# Patient Record
Sex: FEMALE | Race: BLACK OR AFRICAN AMERICAN | NOT HISPANIC OR LATINO | Employment: OTHER | URBAN - METROPOLITAN AREA
[De-identification: names, ages, dates, MRNs, and addresses within clinical notes are randomized per-mention and may not be internally consistent; named-entity substitution may affect disease eponyms.]

---

## 2017-05-08 ENCOUNTER — OFFICE VISIT (OUTPATIENT)
Dept: OBSTETRICS AND GYNECOLOGY | Facility: CLINIC | Age: 67
End: 2017-05-08
Payer: MEDICARE

## 2017-05-08 VITALS
HEIGHT: 64 IN | DIASTOLIC BLOOD PRESSURE: 96 MMHG | SYSTOLIC BLOOD PRESSURE: 172 MMHG | BODY MASS INDEX: 30.39 KG/M2 | WEIGHT: 178 LBS

## 2017-05-08 DIAGNOSIS — Z01.419 ENCOUNTER FOR GYNECOLOGICAL EXAMINATION (GENERAL) (ROUTINE) WITHOUT ABNORMAL FINDINGS: Primary | ICD-10-CM

## 2017-05-08 DIAGNOSIS — Z12.31 SCREENING MAMMOGRAM, ENCOUNTER FOR: ICD-10-CM

## 2017-05-08 DIAGNOSIS — R35.0 URINARY FREQUENCY: ICD-10-CM

## 2017-05-08 PROCEDURE — G0101 CA SCREEN;PELVIC/BREAST EXAM: HCPCS | Mod: PBBFAC,PN | Performed by: OBSTETRICS & GYNECOLOGY

## 2017-05-08 PROCEDURE — 99999 PR PBB SHADOW E&M-NEW PATIENT-LVL II: CPT | Mod: PBBFAC,,, | Performed by: OBSTETRICS & GYNECOLOGY

## 2017-05-08 PROCEDURE — 99202 OFFICE O/P NEW SF 15 MIN: CPT | Mod: PBBFAC,PN,25 | Performed by: OBSTETRICS & GYNECOLOGY

## 2017-05-08 PROCEDURE — G0101 CA SCREEN;PELVIC/BREAST EXAM: HCPCS | Mod: S$PBB,,, | Performed by: OBSTETRICS & GYNECOLOGY

## 2017-05-08 RX ORDER — INSULIN GLARGINE 300 U/ML
INJECTION, SOLUTION SUBCUTANEOUS
Refills: 1 | COMMUNITY
Start: 2017-04-26 | End: 2019-05-23

## 2017-05-08 RX ORDER — LOSARTAN POTASSIUM AND HYDROCHLOROTHIAZIDE 25; 100 MG/1; MG/1
TABLET ORAL
Refills: 6 | COMMUNITY
Start: 2017-04-21

## 2017-05-08 NOTE — PROGRESS NOTES
Subjective:       Patient ID: Davis Jean is a 66 y.o. female.    Chief Complaint:  Well Woman      History of Present Illness  HPI  Annual Exam-Postmenopausal  Patient presents for annual exam. The patient c/o increased urinary frequency --tanna at night for past 6 months; denies dysuria; reports occas leakage--.   The patient is sexually active--using premarin cream for dyspareunia/vaginal dryness. GYN screening history: last pap: was normal and patient does not recall when last pap was and last mammogram: approximate date 2016 and was normal. The patient is not taking hormone replacement therapy. Patient denies post-menopausal vaginal bleeding. The patient wears seatbelts: yes. The patient participates in regular exercise: yes--using exercise bike daily for approx 10 minutes; still followed by neuro for rt leg weakness; has been told she does not have parkinson's--she reports they still are not sure what is wrong with her--walks with use of walker; no longer seeing PT. Has the patient ever been transfused or tattooed?: no. The patient reports that there is not domestic violence in her life.    Denies hot flushes, night sweat;   Feels she is sleeping well    Feels colonoscopy is due next year       GYN & OB History  No LMP recorded. Patient has had a hysterectomy.   Date of Last Pap: No result found    OB History   No data available       Review of Systems  Review of Systems   Constitutional: Negative for activity change, appetite change, chills, diaphoresis, fatigue, fever and unexpected weight change.   HENT: Negative for mouth sores and tinnitus.    Eyes: Negative for discharge and visual disturbance.   Respiratory: Negative for cough, shortness of breath and wheezing.    Cardiovascular: Negative for chest pain, palpitations and leg swelling.   Gastrointestinal: Negative for abdominal pain, bloating, blood in stool, constipation, diarrhea, nausea and vomiting.   Endocrine: Negative for diabetes, hair loss, hot  flashes, hyperthyroidism and hypothyroidism.   Genitourinary: Positive for frequency. Negative for decreased libido, dyspareunia, dysuria, flank pain, genital sores, hematuria, menorrhagia, menstrual problem, pelvic pain, urgency, vaginal bleeding, vaginal discharge, vaginal pain, dysmenorrhea, urinary incontinence, postcoital bleeding, postmenopausal bleeding and vaginal odor.   Musculoskeletal: Negative for back pain and myalgias.   Skin:  Negative for rash, no acne and hair changes.   Neurological: Negative for seizures, syncope, numbness and headaches.   Hematological: Negative for adenopathy. Does not bruise/bleed easily.   Psychiatric/Behavioral: Negative for depression and sleep disturbance. The patient is not nervous/anxious.    Breast: Negative for breast mass, breast pain, nipple discharge and skin changes          Objective:    Physical Exam:   Constitutional: She appears well-developed.     Eyes: Conjunctivae and EOM are normal. Pupils are equal, round, and reactive to light.    Neck: Normal range of motion. Neck supple.     Pulmonary/Chest: Effort normal. Right breast exhibits no mass, no nipple discharge, no skin change and no tenderness. Left breast exhibits no mass, no nipple discharge, no skin change and no tenderness. Breasts are symmetrical.        Abdominal: Soft.     Genitourinary: Rectum normal and vagina normal. Pelvic exam was performed with patient supine. Uterus is absent. Right adnexum displays no mass and no tenderness. Left adnexum displays no mass and no tenderness. No erythema, bleeding, rectocele, cystocele or unspecified prolapse of vaginal walls in the vagina. No vaginal discharge found. Vaginal cuff normal.Labial bartholins normal.Cervix exhibits absence. Also,  no recent pap smear            Musculoskeletal: Normal range of motion.       Neurological: She is alert.    Skin: Skin is warm.    Psychiatric: She has a normal mood and affect.          Assessment:        1. Encounter for  gynecological examination (general) (routine) without abnormal findings    2. Screening mammogram, encounter for               Plan:      Continue annual well woman exam.  Pap not indicated, due to hysterectomy for endometriosis  [facility]  Continue diet, exercise, weight loss  ucx ordered

## 2017-07-18 DIAGNOSIS — M25.561 PAIN IN BOTH KNEES, UNSPECIFIED CHRONICITY: Primary | ICD-10-CM

## 2017-07-18 DIAGNOSIS — M25.562 PAIN IN BOTH KNEES, UNSPECIFIED CHRONICITY: Primary | ICD-10-CM

## 2017-07-19 ENCOUNTER — OFFICE VISIT (OUTPATIENT)
Dept: ORTHOPEDICS | Facility: CLINIC | Age: 67
End: 2017-07-19
Payer: MEDICARE

## 2017-07-19 ENCOUNTER — APPOINTMENT (OUTPATIENT)
Dept: RADIOLOGY | Facility: HOSPITAL | Age: 67
End: 2017-07-19
Attending: ORTHOPAEDIC SURGERY
Payer: MEDICARE

## 2017-07-19 VITALS
WEIGHT: 177.94 LBS | BODY MASS INDEX: 30.38 KG/M2 | RESPIRATION RATE: 12 BRPM | SYSTOLIC BLOOD PRESSURE: 149 MMHG | HEIGHT: 64 IN | HEART RATE: 67 BPM | DIASTOLIC BLOOD PRESSURE: 70 MMHG

## 2017-07-19 DIAGNOSIS — G70.9: Primary | ICD-10-CM

## 2017-07-19 DIAGNOSIS — M25.561 PAIN IN BOTH KNEES, UNSPECIFIED CHRONICITY: ICD-10-CM

## 2017-07-19 DIAGNOSIS — M16.0 ARTHRITIS OF BOTH HIPS: ICD-10-CM

## 2017-07-19 DIAGNOSIS — G20.A1 PARKINSON DISEASE: ICD-10-CM

## 2017-07-19 DIAGNOSIS — M25.562 PAIN IN BOTH KNEES, UNSPECIFIED CHRONICITY: ICD-10-CM

## 2017-07-19 DIAGNOSIS — R52 PAIN: Primary | ICD-10-CM

## 2017-07-19 DIAGNOSIS — M17.0 ARTHRITIS OF BOTH KNEES: ICD-10-CM

## 2017-07-19 PROCEDURE — 73562 X-RAY EXAM OF KNEE 3: CPT | Mod: 26,50,, | Performed by: RADIOLOGY

## 2017-07-19 PROCEDURE — 1126F AMNT PAIN NOTED NONE PRSNT: CPT | Mod: ,,, | Performed by: ORTHOPAEDIC SURGERY

## 2017-07-19 PROCEDURE — 1159F MED LIST DOCD IN RCRD: CPT | Mod: ,,, | Performed by: ORTHOPAEDIC SURGERY

## 2017-07-19 PROCEDURE — 99204 OFFICE O/P NEW MOD 45 MIN: CPT | Mod: S$PBB,,, | Performed by: ORTHOPAEDIC SURGERY

## 2017-07-19 PROCEDURE — 99999 PR PBB SHADOW E&M-EST. PATIENT-LVL III: CPT | Mod: PBBFAC,,, | Performed by: ORTHOPAEDIC SURGERY

## 2017-07-19 PROCEDURE — 99213 OFFICE O/P EST LOW 20 MIN: CPT | Mod: PBBFAC,25,PN | Performed by: ORTHOPAEDIC SURGERY

## 2017-07-19 RX ORDER — PRAVASTATIN SODIUM 40 MG/1
TABLET ORAL
Refills: 5 | COMMUNITY
Start: 2017-07-15 | End: 2018-10-03

## 2017-07-20 DIAGNOSIS — M25.551 RIGHT HIP PAIN: Primary | ICD-10-CM

## 2017-07-21 PROBLEM — M16.0 ARTHRITIS OF BOTH HIPS: Status: ACTIVE | Noted: 2017-07-21

## 2017-07-21 PROBLEM — G70.9: Status: ACTIVE | Noted: 2017-07-21

## 2017-07-21 PROBLEM — M17.0 ARTHRITIS OF BOTH KNEES: Status: ACTIVE | Noted: 2017-07-21

## 2017-07-22 NOTE — PROGRESS NOTES
"Subjective:      Patient ID: Davis Jean is a 66 y.o. female.    Chief Complaint: No chief complaint on file.    HPI: The patient is seen today for another professional opinion regarding her lower extremities "not moving."  She has a complex medical history that involves seeing multiple physicians, both neurologists and orthopedic surgeons.  She has been told that she has Parkinson's disease.  She has been told that she does not have Parkinson's disease.  By history her ambulation capability has point old over the last several years.  Her steps of become shorter and shorter to the point where she is essentially a wheelchair ambulator.  When she walks, she requires the use of a walker.  She is not having pain, she just can't make her legs moved.    Review of Systems   Constitution: Negative for chills, decreased appetite, weight gain and weight loss.   HENT: Negative for congestion, ear pain, hearing loss and sore throat.    Eyes: Negative for blurred vision, double vision, vision loss in left eye, vision loss in right eye and visual disturbance.   Cardiovascular: Negative for chest pain, irregular heartbeat, leg swelling and palpitations.   Respiratory: Negative for cough and shortness of breath.    Endocrine: Negative for cold intolerance and heat intolerance.   Hematologic/Lymphatic: Negative for adenopathy. Does not bruise/bleed easily.   Skin: Negative for color change, nail changes, rash and suspicious lesions.   Musculoskeletal: Positive for arthritis.        Positive for difficulty walking.   Gastrointestinal: Negative for abdominal pain, constipation, diarrhea, heartburn, nausea and vomiting.   Genitourinary: Negative for bladder incontinence and dysuria.   Neurological: Positive for disturbances in coordination. Negative for dizziness, paresthesias and sensory change.   Psychiatric/Behavioral: Negative for altered mental status, depression, memory loss and substance abuse.   Allergic/Immunologic: Negative " for hives and persistent infections.         Objective:            General    Nursing note and vitals reviewed.  Constitutional: She is oriented to person, place, and time. She appears well-developed and well-nourished.   HENT:   Head: Normocephalic.   Nose: Nose normal.   Eyes: EOM are normal. Pupils are equal, round, and reactive to light.   Neck: Normal range of motion. Neck supple.   Cardiovascular: Normal rate and regular rhythm.    Pulmonary/Chest: Effort normal.   Abdominal: Soft. She exhibits no distension. There is no tenderness.   Neurological: She is alert and oriented to person, place, and time. She exhibits normal muscle tone.   Psychiatric: She has a normal mood and affect. Her behavior is normal.     General Musculoskeletal Exam   Gait: abnormal   Pelvic Obliquity: none    Right Ankle/Foot Exam   Right ankle exam is normal.    Range of Motion   The patient has normal right ankle ROM.    Alignment   Forefoot Alignment: normal    Muscle Strength   The patient has normal right ankle strength.    Tests   Anterior drawer: negative  Varus tilt: negative  Heel Walk: able to perform  Tiptoe Walk: able to perform    Other   Sensation: normal  Peroneal Subluxation: negative    Left Ankle/Foot Exam   Left ankle exam is normal.    Range of Motion   The patient has normal left ankle ROM.     Alignment   Forefoot Alignment: normal    Muscle Strength   The patient has normal left ankle strength.    Tests   Anterior drawer: negative  Varus tilt: negative  Heel Walk: able to perform  Tiptoe Walk: able to perform    Other   Sensation: normal  Peroneal Subluxation: negative    Right Knee Exam   Right knee exam is normal.    Inspection   Erythema: absent  Swelling: absent  Effusion: effusion  Deformity: deformity  Bruising: absent    Crepitus   The patient has crepitus of the patella.    Range of Motion   The patient has normal right knee ROM.    Tests   Meniscus   Winston:  Medial - negative Lateral -  negative  Ligament Examination Lachman: normal (-1 to 2mm) PCL-Posterior Drawer: normal (0 to 2mm)     MCL - Valgus: normal (0 to 2mm)  LCL - Varus: normalPivot Shift: normal (Equal)  Posterolateral Corner: unstable (>15 degrees difference)  Patella   Patellar Apprehension: negative  Passive Patellar Tilt: neutral  Patellar Tracking: normal  Patellar Grind: negative    Other   Sensation: normal    Comments:  The patient walks with a very short stepped gait.    Left Knee Exam   Left knee exam is normal.    Inspection   Erythema: absent  Swelling: absent  Effusion: absent  Deformity: deformity  Bruising: absent    Crepitus   The patient has crepitus of the patella.    Range of Motion   The patient has normal left knee ROM.    Tests   Meniscus   Winston:  Medial - negative Lateral - negative  Stability Lachman: normal (-1 to 2mm) PCL-Posterior Drawer: normal (0 to 2mm)  MCL - Valgus: normal (0 to 2mm)  LCL - Varus: normal (0 to 2mm)Pivot Shift: normal (Equal)  Posterolateral Corner: unstable (>15 degrees difference)  Patella   Patellar Apprehension: negative  Passive Patellar Tilt: neutral  Patellar Tracking: normal  Patellar Grind: negative    Other   Sensation: normal    Comments:  The patient walks with a very short stepped gait.    Right Hip Exam   Right hip exam is normal.     Inspection   Swelling: absent  Bruising: absent    Muscle Strength   The patient has normal right hip strength.    Other   Sensation: normal    Comments:  While the patient has minimally decreased internal and external rotation of her hip, it is not painful to place her hip through a range of motion.  Left Hip Exam   Left hip exam is normal.    Inspection   Swelling: absent  Bruising: absent    Range of Motion   The patient has normal left hip ROM.    Muscle Strength   The patient has normal left hip strength.     Other   Sensation: normal    Comments:  While the patient has minimally decreased internal and external rotation of her hip, it  is not painful to place her hip through a range of motion.        Back (L-Spine & T-Spine) / Neck (C-Spine) Exam   Back exam is normal.    Neck (C-Spine) Range of Motion   Flexion:     Normal  Extension: Normal  Right Lateral Bend: normal  Left Lateral Bend: normal  Right Rotation: normal  Left Rotation: normal    Spinal Sensation   Right Side Sensation  C-Spine Level: normal   L-Spine Level: normal  S-Spine Level: normal  T-Spine Level: normal  Left Side Sensation  C-Spine Level: normal  L-Spine Level: normal  S-Spine Level: normal  T-Spine Level: normal    Other She has no scoliosis .  Head Tilt:  Negative      Right Hand/Wrist Exam   Right hand exam is normal.    Inspection   Deformity: Wrist - deformity     Range of Motion   The patient has normal right hand/wrist ROM.    Tests   Phalens Sign: negative  Tinels Sign (Medial Nerve): negative  Finkelstein: negative  Cubital Tunnel Compression Test: negative      Other     Neuorologic Exam    Median Distribution: normal  Ulnar Distribution: normal  Radial Distribution: normal      Left Hand/Wrist Exam   Left hand exam is normal.    Inspection   Deformity: Wrist - absent     Range of Motion   The patient has normal left hand/wrist ROM.    Tests   Phalens Sign: negative  Tinels Sign (Medial Nerve): negative  Finkelstein: negative  Cubital Tunnel Compression Test: negative      Other     Sensory Exam  Median Distribution: normal  Ulnar Distribution: normal  Radial Distribution: normal      Right Elbow Exam   Right elbow exam is normal.    Inspection   Effusion: absent  Bruising: absent  Deformity: absent    Range of Motion   The patient has normal right elbow ROM.    Tests Tinel's Sign (cubital tunnel): negative    Other   Sensation: normal      Left Elbow Exam   Left elbow exam is normal.    Inspection   Effusion: absent  Bruising: absent  Deformity: absent    Range of Motion   The patient has normal left elbow ROM.    Tests Tinel's Sign (cubital tunnel):  negative    Other   Sensation: normal    Right Shoulder Exam   Right shoulder exam is normal.    Tenderness   The patient is tender to palpation of the greater tuberosity.    Range of Motion   Active Abduction: normal   Passive Abduction: normal   Extension: normal   Forward Flexion: normal   Forward Elevation: normal  Adduction: normal  External Rotation 0 degrees: normal   Internal Rotation 0 degrees: normal     Muscle Strength   The patient has normal right shoulder strength.    Tests & Signs   Apprehension: negative  Cross Arm: negative  Impingement: negative    Other   Sensation: normal    Left Shoulder Exam   Left shoulder exam is normal.    Range of Motion   Active Abduction: normal   Passive Abduction: normal   Extension: normal   Forward Flexion: normal   Forward Elevation: normal  Adduction: normal  External Rotation 0 degrees: normal   Internal Rotation 0 degrees: normal     Muscle Strength   The patient has normal left shoulder strength.    Tests & Signs   Apprehension: negative  Cross Arm: negative  Impingement: negative    Other   Sensation: normal       Muscle Strength   Right Upper Extremity   Wrist Extension: 5/5/5   Wrist Flexion: 5/5/5   : 55/5   Elbow Pronation:  5/5   Elbow Supination:  5/5   Elbow Extension: 5/5  Elbow Flexion: 5/5  Intrinsics: 5/5  Left Upper Extremity  Wrist Extension: 5/5   Wrist Flexion: 5/5/5   :  5/5/5   Elbow Pronation:  5/5   Elbow Supination:  5/5   Elbow Extension: 5/5  Elbow Flexion: 5/5  Intrinsics: 5/5  Right Lower Extremity   Hip Abduction: 5/5   Quadriceps:  5/5   Hamstrin/5   Left Lower Extremity   Hip Abduction: 5/5   Quadriceps:  5/5   Hamstrin/5     Reflexes     Left Side  Biceps:  2+  Triceps:  2+  Quadriceps:  2+  Achilles:  2+    Right Side   Biceps:  2+  Triceps:  2+  Quadriceps:  2+  Achilles:  2+    Vascular Exam     Right Pulses  Dorsalis Pedis:      2+  Posterior Tibial:      2+  Radial:                    2+      Left  Pulses  Dorsalis Pedis:      2+  Posterior Tibial:      2+  Radial:                    2+      Capillary Refill  Right Hand: normal capillary refill  Left Hand: normal capillary refill                X-RAY:     2 views of the right hip    Comparison: None.    Findings: There is mild joint space narrowing involving either hip.  There is no evidence for acute fracture or dislocation.  There is is questionable somewhat mottled lucency in the region of the femoral neck/head along with some lucencies possibly present also within the intramedullary cavity with some questionable early erosive changes along the inner cortex of the knee the midshaft of the right femur.  There also some questionable lucency scattered throughout the bony pelvis including the bilateral inferior pubic rami Consider MRI of the right hip and pelvis with contrast for further evaluation  Comparison: None     Bilateral standing ap knees  bilateral sunrise views  right lateral  left lateral     Findings:    Generalized osteopenia with juxta-articular prominence..  Bilateral degenerative changes present.  Findings is suggestive of early infarcts in the distal diametaphysis of the RIGHT femur and proximal RIGHT tibial diametaphysis noted.  Subchondral degenerative cystic change suggested along the medial tibial plateau on the RIGHT.  No fracture or dislocation.  No definite effusion.   Impression         1.  Osteopenia with juxta-articular prominence and mild bilateral degenerative changes.    2.  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    3.  Additional incidental findings as above.  Followup and/or further evaluation as warranted.         Five views of the lumbar spine    Findings: The vertebral bodies demonstrate normal height.  The alignment is within normal limits. There is minimal disc space narrowing noted throughout the lumbar spine. Mild facet arthropathy noted within  the lower lumbar spine. No pars defects.      Assessment:             Encounter Diagnoses   Name Primary?    Parkinson disease     Arthritis of both knees     Arthritis of both hips     Disorder of neuromuscular transmission Yes          Plan:     The patient does not have an orthopedic surgical problem at this time.  The arthritis in her hips and knees is not bad enough to warrant any sort of surgical intervention.  The patient's main complaint is not one of pain but of inability to walk.  She appears to have a disorder of neuromuscular transmission.  Whether or not this involves Parkinson's disease is beyond the realm of my area of expertise.  The patient does not have a classic pill rolling tremor.  It was strongly recommended that the patient return to neurology for further/reevaluation.  I do not have anything to specifically offer from the orthopedic standpoint for this patient.  It should be noted that her lower extremity motor groups were all very strong.  Her ability to put 1 foot in front of the other was, however, severely hampered.  The patient will be seen back on an as-needed basis.

## 2017-08-16 ENCOUNTER — HOSPITAL ENCOUNTER (OUTPATIENT)
Dept: RADIOLOGY | Facility: HOSPITAL | Age: 67
Discharge: HOME | End: 2017-08-16
Attending: ORTHOPAEDIC SURGERY
Payer: MEDICARE

## 2017-08-16 DIAGNOSIS — M25.551 RIGHT HIP PAIN: ICD-10-CM

## 2017-08-16 PROCEDURE — 73721 MRI JNT OF LWR EXTRE W/O DYE: CPT | Mod: TC,PO,RT

## 2017-08-16 PROCEDURE — 73721 MRI JNT OF LWR EXTRE W/O DYE: CPT | Mod: 26,RT,, | Performed by: RADIOLOGY

## 2017-10-11 ENCOUNTER — OFFICE VISIT (OUTPATIENT)
Dept: ORTHOPEDICS | Facility: CLINIC | Age: 67
End: 2017-10-11
Payer: MEDICARE

## 2017-10-11 VITALS
BODY MASS INDEX: 30.38 KG/M2 | RESPIRATION RATE: 16 BRPM | HEIGHT: 64 IN | HEART RATE: 82 BPM | SYSTOLIC BLOOD PRESSURE: 143 MMHG | WEIGHT: 177.94 LBS | DIASTOLIC BLOOD PRESSURE: 68 MMHG

## 2017-10-11 DIAGNOSIS — M25.552 BILATERAL HIP PAIN: Primary | ICD-10-CM

## 2017-10-11 DIAGNOSIS — M31.19 THROMBOTIC THROMBOCYTOPENIC PURPURA (TTP): ICD-10-CM

## 2017-10-11 DIAGNOSIS — M87.051 AVASCULAR NECROSIS OF FEMORAL HEAD, RIGHT: Primary | ICD-10-CM

## 2017-10-11 DIAGNOSIS — M25.551 BILATERAL HIP PAIN: Primary | ICD-10-CM

## 2017-10-11 DIAGNOSIS — M87.052 AVASCULAR NECROSIS OF FEMORAL HEAD, LEFT: ICD-10-CM

## 2017-10-11 PROCEDURE — 99214 OFFICE O/P EST MOD 30 MIN: CPT | Mod: S$PBB,,, | Performed by: ORTHOPAEDIC SURGERY

## 2017-10-11 PROCEDURE — 99999 PR PBB SHADOW E&M-EST. PATIENT-LVL III: CPT | Mod: PBBFAC,,, | Performed by: ORTHOPAEDIC SURGERY

## 2017-10-11 PROCEDURE — 99213 OFFICE O/P EST LOW 20 MIN: CPT | Mod: PBBFAC,PN | Performed by: ORTHOPAEDIC SURGERY

## 2017-10-11 RX ORDER — DICLOFENAC SODIUM 10 MG/G
2 GEL TOPICAL DAILY
Qty: 100 G | Refills: 2 | Status: SHIPPED | OUTPATIENT
Start: 2017-10-11 | End: 2019-05-23

## 2017-10-29 PROBLEM — M87.052 AVASCULAR NECROSIS OF FEMORAL HEAD, LEFT: Status: ACTIVE | Noted: 2017-10-29

## 2017-10-29 PROBLEM — M87.051 AVASCULAR NECROSIS OF FEMORAL HEAD, RIGHT: Status: ACTIVE | Noted: 2017-10-29

## 2017-10-29 PROBLEM — M31.19 THROMBOTIC THROMBOCYTOPENIC PURPURA (TTP): Status: ACTIVE | Noted: 2017-10-29

## 2017-10-29 NOTE — PROGRESS NOTES
"Subjective:     Patient ID: Davis Jean is a 66 y.o. female.    Chief Complaint: Pain of the Right Hip    HPI: The patient is seen in follow-up for his discomfort.  Both her hips bother her but the right is worse than the left.  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  The patient also has avascular necrosis involving other areas as well.  The patient has a history of thrombotic thrombocytopenic purpura.  She has kidneys that are reportedly functioning at only 30% of normal.  The patient is not having enough discomfort at this time to warrant total hip replacement.    Family History   Problem Relation Age of Onset    Diabetes Mother     Heart disease Mother     Cancer Maternal Aunt      Past Medical History:   Diagnosis Date    Diabetes mellitus     Hypertension     Parkinson disease      Social History     Social History    Marital status:      Spouse name: N/A    Number of children: N/A    Years of education: N/A     Social History Main Topics    Smoking status: Never Smoker    Smokeless tobacco: Never Used    Alcohol use No    Drug use: No    Sexual activity: Yes     Partners: Male     Other Topics Concern    None     Social History Narrative    None     Past Surgical History:   Procedure Laterality Date    HYSTERECTOMY       Review of patient's allergies indicates:  No Known Allergies      Medication List with Changes/Refills   New Medications    DICLOFENAC SODIUM (VOLTAREN) 1 % GEL    Apply 2 g topically once daily.   Current Medications    ALENDRONATE (FOSAMAX) 70 MG TABLET    Take 70 mg by mouth every 7 days.    AMLODIPINE (NORVASC) 10 MG TABLET    Take 10 mg by mouth once daily.    ASPIRIN (ECOTRIN) 81 MG EC TABLET    Take 81 mg by mouth once daily.    BD INSULIN PEN NEEDLE UF MINI 31 X 3/16 " NDLE        BRIMONIDINE 0.15 % OPTH DROP (ALPHAGAN) 0.15 % OPHTHALMIC SOLUTION    Place 1 drop into both eyes 2 " (two) times daily.     BRINZOLAMIDE-BRIMONIDINE 1-0.2 % DRPS    Apply to eye every 12 (twelve) hours.    BUPROPION (WELLBUTRIN XL) 300 MG 24 HR TABLET    Take 300 mg by mouth once daily.    CARBIDOPA-LEVODOPA  MG (SINEMET)  MG PER TABLET    Take 1 tablet by mouth 3 (three) times daily.    CONJUGATED ESTROGENS (PREMARIN) VAGINAL CREAM    Place 0.625 g vaginally twice a week.     CONTOUR NEXT STRIPS STRP        DORZOLAMIDE-TIMOLOL 2-0.5% (COSOPT) 2-0.5 % OPHTHALMIC SOLUTION    Place 1 drop into both eyes 2 (two) times daily.     FERROUS SULFATE 325 (65 FE) MG EC TABLET    Take 325 mg by mouth 3 (three) times daily with meals.    FISH OIL-OMEGA-3 FATTY ACIDS 300-1,000 MG CAPSULE    Take 1 g by mouth once daily.    HUMALOG KWIKPEN 100 UNIT/ML INPN PEN        LATANOPROST 0.005 % OPHTHALMIC SOLUTION        LOSARTAN-HYDROCHLOROTHIAZIDE 100-25 MG (HYZAAR) 100-25 MG PER TABLET    TK 1 T PO D    MEGESTROL (MEGACE) 40 MG TAB    Take 40 mg by mouth once daily.    MEGESTROL (MEGACE) 400 MG/10 ML (40 MG/ML) SUSP        METOPROLOL SUCCINATE (TOPROL-XL) 25 MG 24 HR TABLET    Take 25 mg by mouth 2 (two) times daily.     METOPROLOL TARTRATE (LOPRESSOR) 50 MG TABLET    Take 50 mg by mouth 2 (two) times daily.    MIRTAZAPINE (REMERON) 15 MG TABLET    Take 15 mg by mouth nightly as needed.     MULTIVITAMIN-MINERALS-LUTEIN TAB    Take 1 tablet by mouth once daily.    PATANOL 0.1 % OPHTHALMIC SOLUTION        PRAVASTATIN (PRAVACHOL) 10 MG TABLET    Take 10 mg by mouth once daily.     PRAVASTATIN (PRAVACHOL) 40 MG TABLET    TK 1 T PO D    PROMETHAZINE (PHENERGAN) 25 MG TABLET    Take 25 mg by mouth every 6 (six) hours as needed.     ROPINIROLE (REQUIP) 3 MG TABLET    TAKE TWO TABLETS(6MG)  BY MOUTH EVERY DAY    ROPINIROLE (REQUIP) 4 MG TABLET    Take 1 tablet (4 mg total) by mouth nightly.    TOUJEO SOLOSTAR 300 UNIT/ML (1.5 ML) INPN PEN    INJ 25 UNITS SC D       Review of Systems   Constitution: Negative for decreased  "appetite, fever, weight gain and weight loss.   HENT: Negative for ear pain, hearing loss, hoarse voice and sore throat.    Musculoskeletal: Positive for arthritis and joint pain.   Gastrointestinal: Negative.  Negative for constipation, diarrhea, nausea and vomiting.       Objective:   BP (!) 143/68   Pulse 82   Resp 16   Ht 5' 4" (1.626 m)   Wt 80.7 kg (177 lb 14.6 oz)   BMI 30.54 kg/m²       General    Constitutional: She is oriented to person, place, and time. She appears well-developed and well-nourished.   HENT:   Head: Normocephalic.   Nose: Nose normal.   Eyes: EOM are normal. Pupils are equal, round, and reactive to light.   Neck: Normal range of motion. Neck supple.   Cardiovascular: Normal rate and regular rhythm.    Pulmonary/Chest: Effort normal.   Abdominal: Soft. She exhibits no distension. There is no tenderness.   Neurological: She is alert and oriented to person, place, and time.   Psychiatric: She has a normal mood and affect. Her behavior is normal.             The patient has pain with extremes of range of motion in internal and external rotation of both hips.  The right side is more symptomatic than the left.    X-RAY:   Five views of the lumbar spine    Findings: The vertebral bodies demonstrate normal height.  The alignment is within normal limits. There is minimal disc space narrowing noted throughout the lumbar spine. Mild facet arthropathy noted within the lower lumbar spine. No pars defects.  2 views of the right hip    Comparison: None.    Findings: There is mild joint space narrowing involving either hip.  There is no evidence for acute fracture or dislocation.  There is is questionable somewhat mottled lucency in the region of the femoral neck/head along with some lucencies possibly present also within the intramedullary cavity with some questionable early erosive changes along the inner cortex of the knee the midshaft of the right femur.  There also some questionable lucency " scattered throughout the bony pelvis including the bilateral inferior pubic rami Consider MRI of the right hip and pelvis with contrast for further evaluation.  MRI of the right hip without contrast    Technique: Multiplanar multisequence MR imaging of the right hip was performed without contrast.    Comparison: Plain films from 07/19/2017    Findings: There is a serpiginous low T1 signal intensity line with corresponding serpiginous heights T2 signal intensity line surrounding a large portion of the articular surface of the right femoral head.  Findings are most consistent with avascular necrosis.  No subchondral collapse identified.  There is also a smaller area of AVN involving the articular surface of the left femoral head which involves approximately percent of the articular surface.  Small bone infarct also seen in the superior acetabulum on the left.  Additional bone infarct noted in the intertrochanteric region of the right hip.  The remainder of the bony pelvis appears to be intact.  The intrapelvic contents as visualized demonstrate no adenopathy or free fluid.  The patient appears be status post hysterectomy.  The musculature surrounding the right demonstrates normal signal.  No gluteal tendinopathy.  No bursitis.   Impression       1.  Large area of AVN involving the articular surface of the right femoral head.  No subchondral collapse.  Smaller area of AVN seen involving the left femoral head.  Bone infarcts involving the intertrochanteric region of the right hip and the superior acetabulum on the left.             Assessment:         Encounter Diagnoses   Name Primary?    Avascular necrosis of femoral head, right Yes    Avascular necrosis of femoral head, left     Thrombotic thrombocytopenic purpura (TTP)           Plan:     The patient was prescribed Voltaren gel to utilize on one or both hips on an as-needed basis.  The patient will be a candidate for total hip replacement if and when her hip  discomfort increases to an unacceptable level of discomfort and function.  The patient will be seen in 6 months for repeat x-rays of both hips.  Given the patient's age, core decompression of the areas of avascular necrosis is not recommended.  When her arthritic discomfort is bad enough, she is a candidate for joint replacement surgery.             Disclaimer: This note is generated with speech recognition software and is subject to transcription error and sound alike phrases that may be missed by proofreading.

## 2018-04-16 ENCOUNTER — TELEPHONE (OUTPATIENT)
Dept: OBSTETRICS AND GYNECOLOGY | Facility: CLINIC | Age: 68
End: 2018-04-16

## 2018-04-16 NOTE — TELEPHONE ENCOUNTER
I spoke to Dr. Dickson staff and they do not have any sooner appointments. Per their instruction told patient to go to urgent care in [place]. Pt. Verbalized understanding and stated that she will go tomorrow.

## 2018-04-16 NOTE — TELEPHONE ENCOUNTER
----- Message from Tania Gregorio sent at 4/16/2018  9:37 AM CDT -----  Contact: Patient  Patient requesting an appointment for a knot on her vaginal area, but there are no  openings until May, please call her back at [phone]. Thank you

## 2018-04-17 ENCOUNTER — OFFICE VISIT (OUTPATIENT)
Dept: URGENT CARE | Facility: CLINIC | Age: 68
End: 2018-04-17
Payer: MEDICARE

## 2018-04-17 VITALS
HEART RATE: 97 BPM | TEMPERATURE: 99 F | BODY MASS INDEX: 29.17 KG/M2 | OXYGEN SATURATION: 98 % | HEIGHT: 64 IN | RESPIRATION RATE: 18 BRPM | WEIGHT: 170.88 LBS | DIASTOLIC BLOOD PRESSURE: 79 MMHG | SYSTOLIC BLOOD PRESSURE: 184 MMHG

## 2018-04-17 DIAGNOSIS — N76.4 ABSCESS OF LABIA MAJORA: Primary | ICD-10-CM

## 2018-04-17 PROCEDURE — 87070 CULTURE OTHR SPECIMN AEROBIC: CPT

## 2018-04-17 PROCEDURE — 99214 OFFICE O/P EST MOD 30 MIN: CPT | Mod: S$GLB,,, | Performed by: NURSE PRACTITIONER

## 2018-04-17 PROCEDURE — 87147 CULTURE TYPE IMMUNOLOGIC: CPT

## 2018-04-17 PROCEDURE — 99999 PR PBB SHADOW E&M-EST. PATIENT-LVL V: CPT | Mod: PBBFAC,,, | Performed by: NURSE PRACTITIONER

## 2018-04-17 RX ORDER — SULFAMETHOXAZOLE AND TRIMETHOPRIM 800; 160 MG/1; MG/1
1 TABLET ORAL 2 TIMES DAILY
Qty: 14 TABLET | Refills: 0 | Status: SHIPPED | OUTPATIENT
Start: 2018-04-17 | End: 2018-04-24

## 2018-04-17 RX ORDER — MUPIROCIN 20 MG/G
OINTMENT TOPICAL 3 TIMES DAILY
Qty: 22 G | Refills: 0 | Status: SHIPPED | OUTPATIENT
Start: 2018-04-17 | End: 2018-10-03

## 2018-04-17 NOTE — PATIENT INSTRUCTIONS
Abscess (Antibiotic Treatment Only)  An abscess (sometimes called a boil) happens when bacteria get trapped under the skin and start to grow. Pus forms inside the abscess as the body responds to the bacteria. An abscess can happen with an insect bite, ingrown hair, blocked oil gland, pimple, cyst, or puncture wound.  In the early stages, your wound may be red and tender. For this stage, you may get antibiotics. If the abscess does not get better with antibiotics, it will need to be drained with a small cut.  Home care  These tips will help you care for your abscess at home:  · Soak the wound in hot water or apply hot packs (small towel soaked in hot water) to the area for 20 minutes at a time. Do this 3 to 4 times a day.  · Do not cut, squeeze, or pop the boil yourself.  · Apply antibiotic cream or ointment to the skin 3 to 4 times a day, unless something else was prescribed. Some ointments include an antibiotic plus a pain reliever.  · If your doctor prescribed antibiotics, do not stop taking them until you have finished the medicine or the doctor tells you to stop.  · You may use an over-the-counter pain medicine to control pain, unless another pain medicine was prescribed. If you have chronic liver or kidney disease or ever had a stomach ulcer or gastrointestinal bleeding, talk with your doctor before using these any of these.  Follow-up care  Follow up with your healthcare provider, or as advised. Check your wound each day for the signs of worsening infection listed below.  When to seek medical advice  Get prompt medical attention if any of these occur:  · An increase in redness or swelling  · Red streaks in the skin leading away from the abscess  · An increase in local pain or swelling  · Fever of 100.4ºF (38ºC) or higher, or as directed by your healthcare provider  · Pus or fluid coming from the abscess  · Boil returns after getting better  Date Last Reviewed: 9/1/2016  © 5657-7558 [employer]  [employer]. [address] [place] [address]. All rights reserved. This information is not intended as a substitute for professional medical care. Always follow your healthcare professional's instructions.    Use Dial antibacterial Soap daily.  Bactroban ointment to affected area with Q-tip twice daily x 7 days.  Apply heat to area every 2 hours to promote drainage and healing.  Take all antibiotics for full course.  To prevent spread of infection to others in household, cleanse tub/shower with bleach solution.  If the area of redness spreads, you develop fever 100.4 or greater, swelling or pain worsens, contact PCP or go to ER immediately.

## 2018-04-17 NOTE — PROGRESS NOTES
Subjective:       Patient ID: Davis Jean is a 67 y.o. female.    Chief Complaint: Recurrent Skin Infections    67 year old female presents to Urgent Care with reports of right labia abscess that has been present for about 1 week. According to patient the abscess drained a lot on Sunday. Denies any other problems or concerns at this time.       Abscess   Chronicity:  NewProgression Since Onset: worsening  Abscess location: right labia.  Associated Symptoms: no fever, no chills, no sweats  Pain Scale:  3/10  Treatments Tried:  Draining/squeezing  Relieved by:  Nothing    Review of Systems   Constitutional: Negative for appetite change, chills and fever.   HENT: Negative for ear pain, sinus pressure, sore throat and trouble swallowing.    Eyes: Negative for visual disturbance.   Respiratory: Negative for shortness of breath.    Cardiovascular: Negative for chest pain.   Gastrointestinal: Negative for abdominal pain, diarrhea, nausea and vomiting.   Endocrine: Negative for cold intolerance, polyphagia and polyuria.   Genitourinary: Negative for decreased urine volume and dysuria.   Musculoskeletal: Negative for back pain.   Skin: Positive for wound. Negative for rash.        Abscess to right labia     Allergic/Immunologic: Negative for environmental allergies and food allergies.   Neurological: Negative for dizziness, tremors, weakness and numbness.   Hematological: Does not bruise/bleed easily.   Psychiatric/Behavioral: Negative for confusion and hallucinations. The patient is not nervous/anxious and is not hyperactive.    All other systems reviewed and are negative.      Objective:     Physical Exam   Constitutional: She is oriented to person, place, and time. She appears well-developed and well-nourished.   HENT:   Head: Normocephalic and atraumatic.   Right Ear: External ear normal.   Left Ear: External ear normal.   Nose: Nose normal.   Mouth/Throat: Oropharynx is clear and moist.   Eyes: Conjunctivae and EOM are  normal. Pupils are equal, round, and reactive to light.   Neck: Normal range of motion. Neck supple.   Cardiovascular: Normal rate, regular rhythm, normal heart sounds and intact distal pulses.    No murmur heard.  Pulmonary/Chest: Effort normal and breath sounds normal. She has no wheezes.   Abdominal: Soft. Bowel sounds are normal. There is no tenderness.   Genitourinary:         Musculoskeletal: Normal range of motion.   Neurological: She is alert and oriented to person, place, and time. She has normal reflexes.   Skin: Skin is warm and dry. No rash noted.   Psychiatric: She has a normal mood and affect. Her behavior is normal. Judgment and thought content normal.   Nursing note and vitals reviewed.  Instructed patient on the following:  Use Dial antibacterial Soap daily.  Bactroban ointment to affected area with Q-tip twice daily x 7 days.  Apply heat to area every 2 hours to promote drainage and healing.  Take all antibiotics for full course.  To prevent spread of infection to others in household, cleanse tub/shower with bleach solution.  If the area of redness spreads, you develop fever 100.4 or greater, swelling or pain worsens, contact PCP or go to ER immediately.  Agrees and understands plan.   Assessment:     1. Abscess of labia majora      Plan:   Davis was seen today for recurrent skin infections.    Diagnoses and all orders for this visit:    Abscess of labia majora  -     Aerobic culture    Other orders  -     sulfamethoxazole-trimethoprim 800-160mg (BACTRIM DS) 800-160 mg Tab; Take 1 tablet by mouth 2 (two) times daily.  -     mupirocin (BACTROBAN) 2 % ointment; Apply topically 3 (three) times daily.

## 2018-04-20 LAB — BACTERIA SPEC AEROBE CULT: NORMAL

## 2018-04-26 ENCOUNTER — OFFICE VISIT (OUTPATIENT)
Dept: URGENT CARE | Facility: CLINIC | Age: 68
End: 2018-04-26
Payer: MEDICARE

## 2018-04-26 VITALS
HEART RATE: 71 BPM | OXYGEN SATURATION: 99 % | DIASTOLIC BLOOD PRESSURE: 76 MMHG | HEIGHT: 64 IN | TEMPERATURE: 99 F | WEIGHT: 169.75 LBS | SYSTOLIC BLOOD PRESSURE: 180 MMHG | RESPIRATION RATE: 17 BRPM | BODY MASS INDEX: 28.98 KG/M2

## 2018-04-26 DIAGNOSIS — N76.4 ABSCESS OF LABIA: Primary | ICD-10-CM

## 2018-04-26 PROCEDURE — 99999 PR PBB SHADOW E&M-EST. PATIENT-LVL V: CPT | Mod: PBBFAC,,, | Performed by: NURSE PRACTITIONER

## 2018-04-26 PROCEDURE — 99214 OFFICE O/P EST MOD 30 MIN: CPT | Mod: S$GLB,,, | Performed by: NURSE PRACTITIONER

## 2018-04-26 RX ORDER — SULFAMETHOXAZOLE AND TRIMETHOPRIM 800; 160 MG/1; MG/1
1 TABLET ORAL 2 TIMES DAILY
Qty: 6 TABLET | Refills: 0 | Status: SHIPPED | OUTPATIENT
Start: 2018-04-26 | End: 2018-04-29

## 2018-04-26 NOTE — PROGRESS NOTES
"Subjective:      Patient ID: Davis Jean is a 67 y.o. female.    Chief Complaint: Abscess    Mrs. Jean presents to Urgent Care today with complaints of right labia abscess. She was seen here on 4/17/18 for the same complaint. Reports good improvement in pain, swelling, and tenderness. She finished her last dose of bactrim yesterday afternoon. Still doing antibiotic ointment and warm compresses. She still has a very small amount of swelling and tenderness to the site so she wanted to come in and have it checked. No fever. No drainage.       Abscess     Review of Systems   Constitutional: Negative.    HENT: Negative.    Respiratory: Negative.    Cardiovascular: Negative.    Gastrointestinal: Negative.    Skin:        Abscess to right labia majora   Neurological: Negative.        Objective:   BP (!) 180/76   Pulse 71   Temp 99.2 °F (37.3 °C) (Tympanic)   Resp 17   Ht 5' 4" (1.626 m)   Wt 77 kg (169 lb 12.1 oz)   SpO2 99%   BMI 29.14 kg/m²   Physical Exam   Constitutional: She is oriented to person, place, and time. She appears well-developed and well-nourished. No distress.   Neck: Normal range of motion. Neck supple.   Cardiovascular: Normal rate.    Pulmonary/Chest: Effort normal. No respiratory distress.   Genitourinary:   Genitourinary Comments: Healing abscess. There is still a small area of induration (~ 1 cm). No drainage. Minimal tenderness with palpation.   Neurological: She is alert and oriented to person, place, and time.   Skin: Skin is warm and dry. She is not diaphoretic.   Nursing note and vitals reviewed.    Assessment:      1. Abscess of labia       Plan:   Abscess of labia  -     sulfamethoxazole-trimethoprim 800-160mg (BACTRIM DS) 800-160 mg Tab; Take 1 tablet by mouth 2 (two) times daily.  Dispense: 6 tablet; Refill: 0    Will extend antibiotics for three days. Advised to continue warm compresses and topical antibiotic treatment as prescribed at the last Urgent Care visit.   No need to follow " up again as long as symptoms improve. Follow up with PCP if symptoms don't improve or for any new or worsening symptoms.   Instructions, follow up, and supportive care as per AVS.

## 2018-04-26 NOTE — PATIENT INSTRUCTIONS
Abscess (Antibiotic Treatment Only)  An abscess (sometimes called a boil) happens when bacteria get trapped under the skin and start to grow. Pus forms inside the abscess as the body responds to the bacteria. An abscess can happen with an insect bite, ingrown hair, blocked oil gland, pimple, cyst, or puncture wound.  In the early stages, your wound may be red and tender. For this stage, you may get antibiotics. If the abscess does not get better with antibiotics, it will need to be drained with a small cut.  Home care  These tips will help you care for your abscess at home:  · Soak the wound in hot water or apply hot packs (small towel soaked in hot water) to the area for 20 minutes at a time. Do this 3 to 4 times a day.  · Do not cut, squeeze, or pop the boil yourself.  · Apply antibiotic cream or ointment to the skin 3 to 4 times a day, unless something else was prescribed. Some ointments include an antibiotic plus a pain reliever.  · If your doctor prescribed antibiotics, do not stop taking them until you have finished the medicine or the doctor tells you to stop.  · You may use an over-the-counter pain medicine to control pain, unless another pain medicine was prescribed. If you have chronic liver or kidney disease or ever had a stomach ulcer or gastrointestinal bleeding, talk with your doctor before using these any of these.  Follow-up care  Follow up with your healthcare provider, or as advised. Check your wound each day for the signs of worsening infection listed below.  When to seek medical advice  Get prompt medical attention if any of these occur:  · An increase in redness or swelling  · Red streaks in the skin leading away from the abscess  · An increase in local pain or swelling  · Fever of 100.4ºF (38ºC) or higher, or as directed by your healthcare provider  · Pus or fluid coming from the abscess  · Boil returns after getting better  Date Last Reviewed: 9/1/2016  © 6643-4489 [employer] [address] [place] [id number]. All rights reserved. This information is not intended as a substitute for professional medical care. Always follow your healthcare professional's instructions.

## 2018-09-25 ENCOUNTER — TELEPHONE (OUTPATIENT)
Dept: OBSTETRICS AND GYNECOLOGY | Facility: CLINIC | Age: 68
End: 2018-09-25

## 2018-09-25 NOTE — TELEPHONE ENCOUNTER
----- Message from Tania Gregorio sent at 9/25/2018  1:06 PM CDT -----  Contact: Patient  1. What is the name of the medication you are requesting? Premarin  2. What is the dose? 2.5mg  3. How do you take the medication? Orally, topically, etc? orally  4. How often do you take this medication? Once weekly  5. Do you need a 30 day or 90 day supply? 90  6. How many refills are you requesting?   7. What is your preferred pharmacy and location of the [facility]? [facility]  8. Who can we contact with further questions? Patient

## 2018-09-25 NOTE — TELEPHONE ENCOUNTER
I don't see where this medication has been prescribed here. Left message for Pt to call back to schedule an appt. DS

## 2018-09-25 NOTE — TELEPHONE ENCOUNTER
Pt called in returning missed call from Susana. Pt originally called in to get a refill on Premerin cream. Pt is overdue for well woman exam and medication is not currently on med list. Scheduled wwe for pt on 10/3/18 for pt to discuss with Dr. Dickson. Pt verbalized understanding.

## 2018-10-03 ENCOUNTER — OFFICE VISIT (OUTPATIENT)
Dept: OBSTETRICS AND GYNECOLOGY | Facility: CLINIC | Age: 68
End: 2018-10-03
Payer: MEDICARE

## 2018-10-03 VITALS
HEIGHT: 64 IN | DIASTOLIC BLOOD PRESSURE: 86 MMHG | BODY MASS INDEX: 28.98 KG/M2 | SYSTOLIC BLOOD PRESSURE: 138 MMHG | WEIGHT: 169.75 LBS

## 2018-10-03 DIAGNOSIS — Z01.419 ENCOUNTER FOR GYNECOLOGICAL EXAMINATION (GENERAL) (ROUTINE) WITHOUT ABNORMAL FINDINGS: Primary | ICD-10-CM

## 2018-10-03 DIAGNOSIS — N95.2 VAGINAL ATROPHY: ICD-10-CM

## 2018-10-03 DIAGNOSIS — Z12.4 SCREENING FOR CERVICAL CANCER: ICD-10-CM

## 2018-10-03 DIAGNOSIS — Z12.31 SCREENING MAMMOGRAM, ENCOUNTER FOR: ICD-10-CM

## 2018-10-03 PROCEDURE — 99999 PR PBB SHADOW E&M-EST. PATIENT-LVL III: CPT | Mod: PBBFAC,,, | Performed by: OBSTETRICS & GYNECOLOGY

## 2018-10-03 PROCEDURE — 99397 PER PM REEVAL EST PAT 65+ YR: CPT | Mod: S$GLB,,, | Performed by: OBSTETRICS & GYNECOLOGY

## 2018-10-03 NOTE — PROGRESS NOTES
Subjective:       Patient ID: Davis Jean is a 67 y.o. female.    Chief Complaint:  Well Woman      History of Present Illness  HPI  Annual Exam-Postmenopausal  Patient presents for annual exam. The patient has no complaints today c/o vaginal dryness; wants rx for estrogen cream --has been out for past 2 months;  The patient is sexually active--denies pelvic pain; . GYN screening history: last pap: was normal and patient does not recall when last pap was and last mammogram: approximate date 8/2017 and was normal. The patient has never been taking hormone replacement therapy. Patient denies post-menopausal vaginal bleeding. The patient wears seatbelts: yes. The patient participates in regular exercise: yes --5 min daily --on exercise bike;  Has the patient ever been transfused or tattooed?: no. The patient reports that there is not domestic violence in her life.      Denies urinary leakage; c/o increased urinary frequency --mostly at night;       Colonoscopy 2014; due in 2019    Taking fosamax     GYN & OB History  No LMP recorded. Patient has had a hysterectomy.   Date of Last Pap: No result found    OB History   No data available       Review of Systems  Review of Systems   All other systems reviewed and are negative.          Objective:      Physical Exam:   Constitutional: She appears well-developed.     Eyes: Conjunctivae and EOM are normal. Pupils are equal, round, and reactive to light.    Neck: Normal range of motion. Neck supple.     Pulmonary/Chest: Effort normal. Right breast exhibits no mass, no nipple discharge, no skin change and no tenderness. Left breast exhibits no mass, no nipple discharge, no skin change and no tenderness. Breasts are symmetrical.        Abdominal: Soft.     Genitourinary: Rectum normal and vagina normal. Pelvic exam was performed with patient supine. Uterus is absent. Cervix is normal. Right adnexum displays no mass and no tenderness. Left adnexum displays no mass and no  tenderness. No erythema, bleeding, rectocele, cystocele or unspecified prolapse of vaginal walls in the vagina. No vaginal discharge found. Labial bartholins normal.  Genitourinary Comments: atrophic           Musculoskeletal: Normal range of motion.       Neurological: She is alert.    Skin: Skin is warm.    Psychiatric: She has a normal mood and affect.           Assessment:     Encounter Diagnoses   Name Primary?    Encounter for gynecological examination (general) (routine) without abnormal findings Yes    Screening mammogram, encounter for     Screening for cervical cancer     Vaginal atrophy              Plan:      Continue annual well woman exam.  Pap not indicated due to hx of nml pap and hx of shamar  mammo ordered, continue yearly until age 73--pt to get orders from dr shelton (to be done at [place])  Osteoporosis prevention--continue calcium/fosamax  Continue diet, exercise, weight loss  rx sent for premarin vaginal cream

## 2019-05-23 ENCOUNTER — OFFICE VISIT (OUTPATIENT)
Dept: OBSTETRICS AND GYNECOLOGY | Facility: CLINIC | Age: 69
End: 2019-05-23
Payer: MEDICARE

## 2019-05-23 VITALS
SYSTOLIC BLOOD PRESSURE: 128 MMHG | BODY MASS INDEX: 28.6 KG/M2 | HEIGHT: 64 IN | WEIGHT: 167.56 LBS | DIASTOLIC BLOOD PRESSURE: 82 MMHG

## 2019-05-23 DIAGNOSIS — R31.9 HEMATURIA, UNSPECIFIED TYPE: ICD-10-CM

## 2019-05-23 DIAGNOSIS — R30.0 DYSURIA: Primary | ICD-10-CM

## 2019-05-23 PROCEDURE — 99214 OFFICE O/P EST MOD 30 MIN: CPT | Mod: S$GLB,,, | Performed by: OBSTETRICS & GYNECOLOGY

## 2019-05-23 PROCEDURE — 1101F PR PT FALLS ASSESS DOC 0-1 FALLS W/OUT INJ PAST YR: ICD-10-PCS | Mod: CPTII,S$GLB,, | Performed by: OBSTETRICS & GYNECOLOGY

## 2019-05-23 PROCEDURE — 87086 URINE CULTURE/COLONY COUNT: CPT

## 2019-05-23 PROCEDURE — 87077 CULTURE AEROBIC IDENTIFY: CPT

## 2019-05-23 PROCEDURE — 87186 SC STD MICRODIL/AGAR DIL: CPT

## 2019-05-23 PROCEDURE — 87088 URINE BACTERIA CULTURE: CPT

## 2019-05-23 PROCEDURE — 1101F PT FALLS ASSESS-DOCD LE1/YR: CPT | Mod: CPTII,S$GLB,, | Performed by: OBSTETRICS & GYNECOLOGY

## 2019-05-23 PROCEDURE — 99999 PR PBB SHADOW E&M-EST. PATIENT-LVL III: CPT | Mod: PBBFAC,,, | Performed by: OBSTETRICS & GYNECOLOGY

## 2019-05-23 PROCEDURE — 99999 PR PBB SHADOW E&M-EST. PATIENT-LVL III: ICD-10-PCS | Mod: PBBFAC,,, | Performed by: OBSTETRICS & GYNECOLOGY

## 2019-05-23 PROCEDURE — 99214 PR OFFICE/OUTPT VISIT, EST, LEVL IV, 30-39 MIN: ICD-10-PCS | Mod: S$GLB,,, | Performed by: OBSTETRICS & GYNECOLOGY

## 2019-05-23 RX ORDER — NITROFURANTOIN 25; 75 MG/1; MG/1
100 CAPSULE ORAL 2 TIMES DAILY
Qty: 10 CAPSULE | Refills: 0 | Status: SHIPPED | OUTPATIENT
Start: 2019-05-23 | End: 2019-05-28

## 2019-05-23 RX ORDER — UBIDECARENONE 75 MG
100 CAPSULE ORAL
COMMUNITY

## 2019-05-23 RX ORDER — INSULIN DEGLUDEC 100 U/ML
INJECTION, SOLUTION SUBCUTANEOUS
Refills: 1 | COMMUNITY
Start: 2019-04-01

## 2019-05-23 NOTE — PROGRESS NOTES
Subjective:       Patient ID: Davis Jean is a 68 y.o. female.    Chief Complaint:  Urinary Tract Infection      History of Present Illness  HPI  here for problem   C/o pain with urination and has noticed blood in urine since Thursday  Denies fever/chills  Denies back pain    GYN & OB History  No LMP recorded. Patient has had a hysterectomy.   Date of Last Pap: No result found    OB History   No data available       Review of Systems  Review of Systems   Genitourinary: Positive for dysuria and hematuria.   All other systems reviewed and are negative.          Objective:      Physical Exam:   Constitutional: She appears well-developed.     Eyes: Pupils are equal, round, and reactive to light. Conjunctivae and EOM are normal.    Neck: Normal range of motion. Neck supple.     Pulmonary/Chest: Effort normal.        Abdominal: Soft.             Musculoskeletal: Normal range of motion.       Neurological: She is alert.    Skin: Skin is warm.    Psychiatric: She has a normal mood and affect.           Assessment:        Encounter Diagnoses   Name Primary?    Dysuria Yes    Hematuria, unspecified type                Plan:      Urine culture done  U/a --+nitrites  rx sent for macrobid  Pt advise to call if temp >101/cva tenderness

## 2019-05-26 LAB — BACTERIA UR CULT: NORMAL

## 2019-05-27 ENCOUNTER — TELEPHONE (OUTPATIENT)
Dept: OBSTETRICS AND GYNECOLOGY | Facility: CLINIC | Age: 69
End: 2019-05-27

## 2019-05-27 NOTE — TELEPHONE ENCOUNTER
Spoke to the pt. And advised her that the pt has uti; rx was previously sent, please finish all abx and drink lots of water. Pt. Acknowledged understanding. gina Hussein

## 2020-03-11 ENCOUNTER — TELEPHONE (OUTPATIENT)
Dept: OBSTETRICS AND GYNECOLOGY | Facility: CLINIC | Age: 70
End: 2020-03-11

## 2020-03-11 NOTE — TELEPHONE ENCOUNTER
Spoke with patient, advised pt that that was the first available appointment (3/20/20) that  have, offered patient to go to [facility] clinic to see another doctor, pt declined, patient stated she will take the 3/20/20 appointment, I advised patient that she can also call and see an sooner appointment becomes available. Pt voiced understanding.     ----- Message from Jhon Sylvester sent at 3/11/2020  9:44 AM CDT -----  Contact: PT   Type:  Sooner Apoointment Request    Caller is requesting a sooner appointment.  Caller declined first available appointment listed below.  Caller will not accept being placed on the waitlist and is requesting a message be sent to doctor.  Name of Caller: PT   When is the first available appointment? 03/30/2020  Symptoms: Lower pelvic pain   Would the patient rather a call back or a response via [facility]? Call back   Best Call Back Number: [phone] (home)   Additional Information: n/a

## 2020-05-28 ENCOUNTER — OFFICE VISIT (OUTPATIENT)
Dept: OBSTETRICS AND GYNECOLOGY | Facility: CLINIC | Age: 70
End: 2020-05-28
Payer: MEDICARE

## 2020-05-28 VITALS
BODY MASS INDEX: 27.48 KG/M2 | HEIGHT: 64 IN | SYSTOLIC BLOOD PRESSURE: 128 MMHG | DIASTOLIC BLOOD PRESSURE: 74 MMHG | WEIGHT: 160.94 LBS

## 2020-05-28 DIAGNOSIS — Z12.31 SCREENING MAMMOGRAM, ENCOUNTER FOR: ICD-10-CM

## 2020-05-28 DIAGNOSIS — Z12.4 SCREENING FOR CERVICAL CANCER: ICD-10-CM

## 2020-05-28 DIAGNOSIS — Z01.419 ENCOUNTER FOR GYNECOLOGICAL EXAMINATION (GENERAL) (ROUTINE) WITHOUT ABNORMAL FINDINGS: Primary | ICD-10-CM

## 2020-05-28 PROCEDURE — G0101 CA SCREEN;PELVIC/BREAST EXAM: HCPCS | Mod: S$GLB,,, | Performed by: OBSTETRICS & GYNECOLOGY

## 2020-05-28 PROCEDURE — G0101 PR CA SCREEN;PELVIC/BREAST EXAM: ICD-10-PCS | Mod: S$GLB,,, | Performed by: OBSTETRICS & GYNECOLOGY

## 2020-05-28 PROCEDURE — 99999 PR PBB SHADOW E&M-EST. PATIENT-LVL III: CPT | Mod: PBBFAC,,, | Performed by: OBSTETRICS & GYNECOLOGY

## 2020-05-28 PROCEDURE — 99999 PR PBB SHADOW E&M-EST. PATIENT-LVL III: ICD-10-PCS | Mod: PBBFAC,,, | Performed by: OBSTETRICS & GYNECOLOGY

## 2020-05-28 NOTE — PROGRESS NOTES
Subjective:       Patient ID: Davis Jean is a 69 y.o. female.    Chief Complaint:  Well Woman (abdominal pain x;s 1 month ago)      History of Present Illness  HPI  Annual Exam-Postmenopausal  Patient presents for annual exam. The patient c/o pain when standing from sofa approx 1 month; no longer having pain;  The patient is sexually active--denies vaginal dryness; using lubricant; . GYN screening history: last pap: was normal and patient does not recall when last pap was and last mammogram: approximate date 2019 and was normal. The patient is not currently taking hormone replacement therapy--no longer having hot flushes; . Patient denies post-menopausal vaginal bleeding. The patient wears seatbelts: yes. The patient participates in regular exercise: yes--hand weight; problems walking--cannot move legs; followed by neurology; . Has the patient ever been transfused or tattooed?: no. The patient reports that there is not domestic violence in her life.    Denies leak of urine      GYN & OB History  No LMP recorded. Patient has had a hysterectomy.   Date of Last Pap: No result found    OB History   No data available       Review of Systems  Review of Systems   Genitourinary: Positive for vaginal dryness.   All other systems reviewed and are negative.          Objective:      Physical Exam:   Constitutional: She appears well-developed.     Eyes: Pupils are equal, round, and reactive to light. Conjunctivae and EOM are normal.    Neck: Normal range of motion. Neck supple.     Pulmonary/Chest: Effort normal. Right breast exhibits no mass, no nipple discharge, no skin change and no tenderness. Left breast exhibits no mass, no nipple discharge, no skin change and no tenderness. Breasts are symmetrical.        Abdominal: Soft.     Genitourinary: Rectum normal and vagina normal. Pelvic exam was performed with patient supine. Uterus is absent. Right adnexum displays no tenderness. Left adnexum displays no tenderness. No  erythema, bleeding, rectocele, cystocele or unspecified prolapse of vaginal walls in the vagina. No vaginal discharge found. Vaginal cuff normal.Labial bartholins normal.Cervix exhibits absence.           Musculoskeletal: Normal range of motion.       Neurological: She is alert.    Skin: Skin is warm.    Psychiatric: She has a normal mood and affect.           Assessment:     Encounter Diagnoses   Name Primary?    Encounter for gynecological examination (general) (routine) without abnormal findings Yes    Screening for cervical cancer     Screening mammogram, encounter for                Plan:      Continue annual well woman exam.  Pap not indicated due to hx of nml pap and hx of shamar    mammo recommended, continue yearly until age 73  Osteoporosis prevention; 1200mg calcium/d with source of vitamin d  Encouraged diet, exercise, weight loss